# Patient Record
(demographics unavailable — no encounter records)

---

## 2024-11-04 NOTE — PHYSICAL EXAM
[Well Developed] : well developed [Well Nourished] : well nourished [No Acute Distress] : no acute distress [Normal Conjunctiva] : normal conjunctiva [Normal Venous Pressure] : normal venous pressure [5th Left ICS - MCL] : palpated at the 5th LICS in the midclavicular line [Normal] : normal [No Precordial Heave] : no precordial heave was noted [Normal Rate] : normal [Rhythm Regular] : regular [Normal S1] : normal S1 [Normal S2] : normal S2 [S2 Accentuated] : was accentuated [No Murmur] : no murmurs heard [No Pitting Edema] : no pitting edema present [2+] : left 2+ [Clear Lung Fields] : clear lung fields [Good Air Entry] : good air entry [No Respiratory Distress] : no respiratory distress  [Soft] : abdomen soft [Non Tender] : non-tender [Normal Bowel Sounds] : normal bowel sounds [Normal Gait] : normal gait [No Edema] : no edema [No Varicosities] : no varicosities [No Rash] : no rash [Moves all extremities] : moves all extremities [No Focal Deficits] : no focal deficits [Alert and Oriented] : alert and oriented

## 2024-11-05 NOTE — HISTORY OF PRESENT ILLNESS
[FreeTextEntry1] : Ms. Sierra is a 69yo F with PMHx of AS s/p AVR, HTN, HLD, DM, GERD, pernicious anemia, psoriasis who presents for follow up. Her PMD is Dr. James Blanco. In December 2022, patient was having worsening SOB and found to have severe AS and had work up for replacement. Underwent AVR with Gonzáles Inspiris 21 mm bioprosthetic valve with root repair.  03/27/23-Patient feeling well. Denies SOB. She has been still doing exercises at home.  07/11/23-Patient is feeling well overall. Has been doing well with cardiac rehab.  11/14/23-Patient is doing well. She finished cardiac rehab and enjoyed it.  05/07/24-Patient is feeling well overall. She had some lower back pain which she has had in the past. She denies CP, SOB, lightheadedness, dizziness, palpitations.  11/05/24-Patient feeling well overall. She noticed some darkening of her tongue. She also cracked a tooth on a pistachio nut.

## 2024-11-05 NOTE — ASSESSMENT
[FreeTextEntry1] : S/P AVR: Patient s/p AVR. -Her breathing has been better. -She is improving -Continue with ASA 81mg PO daily.  HTN: BP at goal per ACC/AHA 2018 guidelines -Continue with cardizem 240mg PO daily and valsartan 80mg PO daily.   HLD: , , HDL 64, LDL 57 (01/24) -Continue with rosuvastatin 10mg PO daily.  DM: HA1c 6.4%->6.8% (01/24) -Continue with metformin 500mg PO BID  Cracked tooth:  -Pt instructed to follow up with dental.  -If further work up needed, she will need to have antibiotic prophylaxis.   Follow up in 6 months

## 2024-11-05 NOTE — REASON FOR VISIT
[Other: ____] : [unfilled] [FreeTextEntry1] : Diagnostic Tests: --------------------- EK24-NSR with 1st degree AVB.  23-NSR with 1st degree AVB. Possible LAE. TWF.   22-NSR. Normal EKG.  --------------------- Echo:  09/10/24-TTE: EF 58%. 21mm Gonzáles Inspiris BAV with normal function. Trace MR.  23-TTE: EF 73%. 21mm Gonzáles Inspiris BAV with normal function. Mild MR.  22-TTE: EF 72%. Severe AS (PV 5.88 m/s,  mmHg, MG 80 mmHg, DI 0.2, LUBA 0.5 cm2). Moderate AI. Trace MR.  --------------------- Cath:  22-LHC: normal coronary arteries.  --------------------- US:  22-LE Venous: Negative for DVT.  22-Carotid: Mild (20-39%) B/L stenosis.

## 2024-11-05 NOTE — HISTORY OF PRESENT ILLNESS
[FreeTextEntry1] : Ms. Sierra is a 71yo F with PMHx of AS s/p AVR, HTN, HLD, DM, GERD, pernicious anemia, psoriasis who presents for follow up. Her PMD is Dr. James Blanco. In December 2022, patient was having worsening SOB and found to have severe AS and had work up for replacement. Underwent AVR with Gonzáles Inspiris 21 mm bioprosthetic valve with root repair.  03/27/23-Patient feeling well. Denies SOB. She has been still doing exercises at home.  07/11/23-Patient is feeling well overall. Has been doing well with cardiac rehab.  11/14/23-Patient is doing well. She finished cardiac rehab and enjoyed it.  05/07/24-Patient is feeling well overall. She had some lower back pain which she has had in the past. She denies CP, SOB, lightheadedness, dizziness, palpitations.  11/05/24-Patient feeling well overall. She noticed some darkening of her tongue. She also cracked a tooth on a pistachio nut.

## 2025-07-29 NOTE — REASON FOR VISIT
[FreeTextEntry1] : Diagnostic Tests: --------------------- EK25-NSR. Low voltage.  24-NSR with 1st degree AVB.  23-NSR with 1st degree AVB. Possible LAE. TWF.   22-NSR. Normal EKG.  --------------------- Echo:  09/10/24-TTE: EF 58%. 21mm Gonzáles Inspiris BAV with normal function. Trace MR.  23-TTE: EF 73%. 21mm Gonzáles Inspiris BAV with normal function. Mild MR.  22-TTE: EF 72%. Severe AS (PV 5.88 m/s,  mmHg, MG 80 mmHg, DI 0.2, LUBA 0.5 cm2). Moderate AI. Trace MR.  --------------------- Cath:  22-LHC: normal coronary arteries.  --------------------- US:  22-LE Venous: Negative for DVT.  22-Carotid: Mild (20-39%) B/L stenosis.

## 2025-07-29 NOTE — ASSESSMENT
[FreeTextEntry1] : S/P AVR: Patient s/p AVR. -Her breathing has been better. -She is improving -Continue with ASA 81mg PO daily. -Check TTE.   HTN: BP at goal per ACC/AHA 2018 guidelines -Continue with cardizem 240mg PO daily and valsartan 80mg PO daily.   HLD: , , HDL 64, LDL 57 (01/24)->, , HDL 60, LDL 56 (12/24) -Continue with rosuvastatin 10mg PO daily.  DM: HA1c 6.4%->6.8% (01/24)->6.5% (12/24) -Continue with metformin 500mg PO BID  Cracked tooth:  -Pt instructed to follow up with dental.  -If further work up needed, she will need to have antibiotic prophylaxis.   Follow up in 6 months -Check TTE.